# Patient Record
Sex: FEMALE | Race: OTHER | Employment: FULL TIME | URBAN - METROPOLITAN AREA
[De-identification: names, ages, dates, MRNs, and addresses within clinical notes are randomized per-mention and may not be internally consistent; named-entity substitution may affect disease eponyms.]

---

## 2021-11-03 ENCOUNTER — TELEPHONE (OUTPATIENT)
Dept: OBGYN CLINIC | Facility: CLINIC | Age: 31
End: 2021-11-03

## 2021-11-03 NOTE — TELEPHONE ENCOUNTER
LMP: Unsure mid to late August, states +HPT and states she went to [facility] on 10/24/2021 and had quant HCG done, pt states they indicated to her that her levels stated she was 7+ wks.  RN asked pt why she went to  for levels, pt stated because she \"wanted

## 2021-11-03 NOTE — TELEPHONE ENCOUNTER
Pt last LMP  Some times in late august ,   Pt aslo has pink discharge ,  At home positive pregnancy test ,

## 2021-11-04 ENCOUNTER — HOSPITAL ENCOUNTER (EMERGENCY)
Facility: HOSPITAL | Age: 31
Discharge: HOME | End: 2021-11-04
Payer: MEDICAID

## 2021-11-04 ENCOUNTER — APPOINTMENT (OUTPATIENT)
Dept: ULTRASOUND IMAGING | Facility: HOSPITAL | Age: 31
End: 2021-11-04
Attending: NURSE PRACTITIONER
Payer: MEDICAID

## 2021-11-04 VITALS
WEIGHT: 150 LBS | RESPIRATION RATE: 18 BRPM | TEMPERATURE: 98 F | OXYGEN SATURATION: 99 % | DIASTOLIC BLOOD PRESSURE: 69 MMHG | HEART RATE: 68 BPM | SYSTOLIC BLOOD PRESSURE: 144 MMHG

## 2021-11-04 DIAGNOSIS — O20.9 VAGINAL BLEEDING IN PREGNANCY, FIRST TRIMESTER: Primary | ICD-10-CM

## 2021-11-04 DIAGNOSIS — Z67.91 RH NEGATIVE STATE IN ANTEPARTUM PERIOD, FIRST TRIMESTER: ICD-10-CM

## 2021-11-04 DIAGNOSIS — O26.891 RH NEGATIVE STATE IN ANTEPARTUM PERIOD, FIRST TRIMESTER: ICD-10-CM

## 2021-11-04 DIAGNOSIS — N30.00 ACUTE CYSTITIS WITHOUT HEMATURIA: ICD-10-CM

## 2021-11-04 PROCEDURE — 84702 CHORIONIC GONADOTROPIN TEST: CPT | Performed by: NURSE PRACTITIONER

## 2021-11-04 PROCEDURE — 86900 BLOOD TYPING SEROLOGIC ABO: CPT | Performed by: NURSE PRACTITIONER

## 2021-11-04 PROCEDURE — 76817 TRANSVAGINAL US OBSTETRIC: CPT | Performed by: NURSE PRACTITIONER

## 2021-11-04 PROCEDURE — 81025 URINE PREGNANCY TEST: CPT

## 2021-11-04 PROCEDURE — 86850 RBC ANTIBODY SCREEN: CPT | Performed by: NURSE PRACTITIONER

## 2021-11-04 PROCEDURE — 85025 COMPLETE CBC W/AUTO DIFF WBC: CPT | Performed by: NURSE PRACTITIONER

## 2021-11-04 PROCEDURE — 76801 OB US < 14 WKS SINGLE FETUS: CPT | Performed by: NURSE PRACTITIONER

## 2021-11-04 PROCEDURE — 96372 THER/PROPH/DIAG INJ SC/IM: CPT

## 2021-11-04 PROCEDURE — 36415 COLL VENOUS BLD VENIPUNCTURE: CPT

## 2021-11-04 PROCEDURE — 99284 EMERGENCY DEPT VISIT MOD MDM: CPT

## 2021-11-04 PROCEDURE — 87086 URINE CULTURE/COLONY COUNT: CPT | Performed by: NURSE PRACTITIONER

## 2021-11-04 PROCEDURE — 80076 HEPATIC FUNCTION PANEL: CPT | Performed by: NURSE PRACTITIONER

## 2021-11-04 PROCEDURE — 86901 BLOOD TYPING SEROLOGIC RH(D): CPT | Performed by: NURSE PRACTITIONER

## 2021-11-04 PROCEDURE — 81001 URINALYSIS AUTO W/SCOPE: CPT | Performed by: NURSE PRACTITIONER

## 2021-11-04 PROCEDURE — 80048 BASIC METABOLIC PNL TOTAL CA: CPT | Performed by: NURSE PRACTITIONER

## 2021-11-04 RX ORDER — CEPHALEXIN 500 MG/1
500 CAPSULE ORAL 3 TIMES DAILY
Qty: 21 CAPSULE | Refills: 0 | Status: SHIPPED | OUTPATIENT
Start: 2021-11-04 | End: 2021-11-11

## 2021-11-04 NOTE — ED INITIAL ASSESSMENT (HPI)
Patient complains of abd pain and spotting since yesterday, states she found out she was pregnant about a week ago

## 2021-11-04 NOTE — TELEPHONE ENCOUNTER
Pt states she did not go to the ER yesterday. She got off work late, felt nauseous, vomited then felt better. Pt states she feels fine today. Pt informed we recommended the ER due to her spotting and cramping.   Pt states she only had light pink spotting

## 2021-11-04 NOTE — TELEPHONE ENCOUNTER
Patient calling to proceed with scheduling, patient is feeling much better today. Please call at [phone],[facility].

## 2021-11-04 NOTE — TELEPHONE ENCOUNTER
Pt advised to go to the ER and explain that she is early pregnant and had some spotting and is unsure if her blood type, but thinks it might be negative. Pt agrees with plan.

## 2021-11-05 NOTE — ED QUICK NOTES
Pt A&OX4, pt denies chills, fever, low back pain. Discharge paperwork, prescription, and follow-up discussed with pt, pt verbally understands them. Pt discharged in no acute distress.

## 2021-11-05 NOTE — ED PROVIDER NOTES
Patient Seen in: [facility] AND [facility] Emergency Department      History   Patient presents with:  Pregnancy Issues    Stated Complaint: Pregnancy Issues    Subjective:   HPI    70-year-old female,  1 para 0,LMP9 presents to the emergency room with co Mental Status: She is alert. ED Course     Labs Reviewed   URINALYSIS WITH CULTURE REFLEX - Abnormal; Notable for the following components:       Result Value    Leukocyte Esterase Urine Moderate (*)     Squamous Epi.  Cells Few (*)     All othe RAINBOW DRAW GOLD   URINE CULTURE, ROUTINE   CBC W/ DIFFERENTIAL                   MDM      72-year-old female with spotting and mild cramping first trimester pregnancy  Differential diagnosis considered for causes of vaginal bleeding including ectopic p

## 2021-11-06 NOTE — TELEPHONE ENCOUNTER
Pt was seen in the ER on 11/4/2021. US done. Blood type was O-. Pt is scheduled for rhogam injection on 11/6/2021.

## 2021-11-10 ENCOUNTER — OFFICE VISIT (OUTPATIENT)
Dept: OBGYN CLINIC | Facility: CLINIC | Age: 31
End: 2021-11-10
Payer: MEDICAID

## 2021-11-10 VITALS — SYSTOLIC BLOOD PRESSURE: 118 MMHG | HEART RATE: 83 BPM | DIASTOLIC BLOOD PRESSURE: 80 MMHG | WEIGHT: 157.38 LBS

## 2021-11-10 DIAGNOSIS — O20.0 THREATENED ABORTION, ANTEPARTUM: Primary | ICD-10-CM

## 2021-11-10 PROCEDURE — 99203 OFFICE O/P NEW LOW 30 MIN: CPT | Performed by: OBSTETRICS & GYNECOLOGY

## 2021-11-10 PROCEDURE — 3074F SYST BP LT 130 MM HG: CPT | Performed by: OBSTETRICS & GYNECOLOGY

## 2021-11-10 PROCEDURE — 3079F DIAST BP 80-89 MM HG: CPT | Performed by: OBSTETRICS & GYNECOLOGY

## 2021-11-10 NOTE — H&P
HPI:  The patient is a 37 yo G1PO @ 10w2d by 1TUS here with vaginal bleeding. Pt went to ER on 11/4 with spotting. US showed viable IUP and received rhogam.  Bleeding had stopped.   IC a few days ago and bright red unprovoked bleeding this AM.  No crampin file  Physical Activity: Not on file  Stress: Not on file  Social Connections: Not on file  Intimate Partner Violence: Not on file  Housing Stability: Not on file    FAMILY HISTORY:  History reviewed. No pertinent family history.     MEDICATIONS:    Current antepartum        Scant blood in vault with viable IUP on exam.  Rhogam already given in ER. Discussed PNC in our office. All questions answered.   Taking PNVs

## 2021-11-18 ENCOUNTER — NURSE ONLY (OUTPATIENT)
Dept: OBGYN CLINIC | Facility: CLINIC | Age: 31
End: 2021-11-18
Payer: MEDICAID

## 2021-11-18 ENCOUNTER — LAB ENCOUNTER (OUTPATIENT)
Dept: LAB | Facility: HOSPITAL | Age: 31
End: 2021-11-18
Attending: OBSTETRICS & GYNECOLOGY
Payer: MEDICAID

## 2021-11-18 ENCOUNTER — TELEPHONE (OUTPATIENT)
Dept: OBGYN CLINIC | Facility: CLINIC | Age: 31
End: 2021-11-18

## 2021-11-18 VITALS — HEIGHT: 62 IN | BODY MASS INDEX: 28.82 KG/M2 | WEIGHT: 156.63 LBS

## 2021-11-18 DIAGNOSIS — Z36.9 FIRST TRIMESTER SCREENING: Primary | ICD-10-CM

## 2021-11-18 DIAGNOSIS — Z34.01 ENCOUNTER FOR SUPERVISION OF NORMAL FIRST PREGNANCY IN FIRST TRIMESTER: ICD-10-CM

## 2021-11-18 DIAGNOSIS — Z34.01 ENCOUNTER FOR SUPERVISION OF NORMAL FIRST PREGNANCY IN FIRST TRIMESTER: Primary | ICD-10-CM

## 2021-11-18 PROCEDURE — 86850 RBC ANTIBODY SCREEN: CPT

## 2021-11-18 PROCEDURE — 86901 BLOOD TYPING SEROLOGIC RH(D): CPT

## 2021-11-18 PROCEDURE — 87086 URINE CULTURE/COLONY COUNT: CPT

## 2021-11-18 PROCEDURE — 86900 BLOOD TYPING SEROLOGIC ABO: CPT

## 2021-11-18 PROCEDURE — 86787 VARICELLA-ZOSTER ANTIBODY: CPT

## 2021-11-18 PROCEDURE — 86880 COOMBS TEST DIRECT: CPT

## 2021-11-18 PROCEDURE — 87340 HEPATITIS B SURFACE AG IA: CPT

## 2021-11-18 PROCEDURE — 99211 OFF/OP EST MAY X REQ PHY/QHP: CPT | Performed by: OBSTETRICS & GYNECOLOGY

## 2021-11-18 PROCEDURE — 86780 TREPONEMA PALLIDUM: CPT

## 2021-11-18 PROCEDURE — 36415 COLL VENOUS BLD VENIPUNCTURE: CPT

## 2021-11-18 PROCEDURE — 86803 HEPATITIS C AB TEST: CPT

## 2021-11-18 PROCEDURE — 3008F BODY MASS INDEX DOCD: CPT | Performed by: OBSTETRICS & GYNECOLOGY

## 2021-11-18 PROCEDURE — 86870 RBC ANTIBODY IDENTIFICATION: CPT

## 2021-11-18 PROCEDURE — 86762 RUBELLA ANTIBODY: CPT

## 2021-11-18 PROCEDURE — 87389 HIV-1 AG W/HIV-1&-2 AB AG IA: CPT

## 2021-11-18 PROCEDURE — 86077 PHYS BLOOD BANK SERV XMATCH: CPT

## 2021-11-18 PROCEDURE — 85025 COMPLETE CBC W/AUTO DIFF WBC: CPT

## 2021-11-18 RX ORDER — DIPHENHYDRAMINE HYDROCHLORIDE 25 MG/1
25 CAPSULE ORAL 2 TIMES DAILY
COMMUNITY

## 2021-11-18 NOTE — TELEPHONE ENCOUNTER
Pt seen for OBN today. She is 11 weeks. Wishes for FTS and MFM consult. She will be 13 weeks at NPN so sending to [facility] now. Please initiate so pt can call and make appt for FTS. Thank you.

## 2021-11-18 NOTE — PROGRESS NOTES
Pt seen for OBN appt today with no complaints. Normal PN labs plus Hep C and varicella ordered. Pt advised all labs must be completed and resulted prior to MD appt. Pt scheduled NPN appt with MD.    JIN has end stage renal disease.  Has hx of 2 kidney tr with:  Patient's age 28 years or older as of estimated date of delivery: No   [place]ia ([facility], [facility] [place], [address] [facility], or  background):  MCV less than 80: No   Neural tube defect (Meningomyelocele, Spina bifida, or Anencephaly): No   Congenital hear

## 2021-11-19 ENCOUNTER — TELEPHONE (OUTPATIENT)
Dept: PERINATAL CARE | Facility: HOSPITAL | Age: 31
End: 2021-11-19

## 2021-11-19 NOTE — TELEPHONE ENCOUNTER
Patient calling at 11 4/7 to schedule FTS. We do not have availability phone number given to other locations. Patient stated that appointment important due to partner with Kidney transplant.   Informed patient DX is not listed on order and that I will try

## 2021-11-29 ENCOUNTER — INITIAL PRENATAL (OUTPATIENT)
Dept: OBGYN CLINIC | Facility: CLINIC | Age: 31
End: 2021-11-29
Payer: MEDICAID

## 2021-11-29 VITALS
HEART RATE: 76 BPM | WEIGHT: 155.81 LBS | BODY MASS INDEX: 29 KG/M2 | DIASTOLIC BLOOD PRESSURE: 78 MMHG | SYSTOLIC BLOOD PRESSURE: 121 MMHG

## 2021-11-29 DIAGNOSIS — Z34.01 ENCOUNTER FOR SUPERVISION OF NORMAL FIRST PREGNANCY IN FIRST TRIMESTER: Primary | ICD-10-CM

## 2021-11-29 PROBLEM — O26.899 RH NEGATIVE STATE IN ANTEPARTUM PERIOD: Status: ACTIVE | Noted: 2021-11-29

## 2021-11-29 PROBLEM — Z67.91 RH NEGATIVE STATE IN ANTEPARTUM PERIOD: Status: ACTIVE | Noted: 2021-11-29

## 2021-11-29 PROCEDURE — 3074F SYST BP LT 130 MM HG: CPT | Performed by: OBSTETRICS & GYNECOLOGY

## 2021-11-29 PROCEDURE — 0500F INITIAL PRENATAL CARE VISIT: CPT | Performed by: OBSTETRICS & GYNECOLOGY

## 2021-11-29 PROCEDURE — 3078F DIAST BP <80 MM HG: CPT | Performed by: OBSTETRICS & GYNECOLOGY

## 2021-11-29 PROCEDURE — 81002 URINALYSIS NONAUTO W/O SCOPE: CPT | Performed by: OBSTETRICS & GYNECOLOGY

## 2021-12-02 NOTE — PROGRESS NOTES
Has appt for FTS tomorrow, had rhogam on 11/4 for VB, she has never had a pap smear, h/o vaping told her to stop immediately, h/o irreg menses so dated by US, pap done

## 2021-12-27 ENCOUNTER — ROUTINE PRENATAL (OUTPATIENT)
Dept: OBGYN CLINIC | Facility: CLINIC | Age: 31
End: 2021-12-27
Payer: MEDICAID

## 2021-12-27 VITALS
BODY MASS INDEX: 28 KG/M2 | DIASTOLIC BLOOD PRESSURE: 72 MMHG | WEIGHT: 155.38 LBS | HEART RATE: 67 BPM | SYSTOLIC BLOOD PRESSURE: 112 MMHG

## 2021-12-27 DIAGNOSIS — Z34.92 ENCOUNTER FOR SUPERVISION OF NORMAL PREGNANCY IN SECOND TRIMESTER, UNSPECIFIED GRAVIDITY: Primary | ICD-10-CM

## 2021-12-27 PROCEDURE — 3078F DIAST BP <80 MM HG: CPT | Performed by: OBSTETRICS & GYNECOLOGY

## 2021-12-27 PROCEDURE — 3074F SYST BP LT 130 MM HG: CPT | Performed by: OBSTETRICS & GYNECOLOGY

## 2021-12-27 PROCEDURE — 81002 URINALYSIS NONAUTO W/O SCOPE: CPT | Performed by: OBSTETRICS & GYNECOLOGY

## 2021-12-27 PROCEDURE — 0502F SUBSEQUENT PRENATAL CARE: CPT | Performed by: OBSTETRICS & GYNECOLOGY

## 2021-12-29 NOTE — PROGRESS NOTES
Cindy Shields at visit- he runs a [employer]. Message from [facility] about normal panorama but I don't see it scanned. Scheduled for booster and flu vaccines today. Level 2 01-20-22.

## 2022-01-12 ENCOUNTER — TELEPHONE (OUTPATIENT)
Dept: OBGYN CLINIC | Facility: CLINIC | Age: 32
End: 2022-01-12

## 2022-01-12 RX ORDER — BUTALBITAL, ACETAMINOPHEN AND CAFFEINE 300; 40; 50 MG/1; MG/1; MG/1
1 CAPSULE ORAL EVERY 4 HOURS PRN
Qty: 20 CAPSULE | Refills: 0 | Status: SHIPPED | OUTPATIENT
Start: 2022-01-12

## 2022-01-12 NOTE — TELEPHONE ENCOUNTER
Pt informed of [facility] recs and verbalized understanding. Received pop up when trying to order Fioricet stating that \"controlled substances cannot be e prescribed since the logged in user is different from the authorizing provider\".  Med pended to [facility] to

## 2022-01-22 ENCOUNTER — HOSPITAL ENCOUNTER (EMERGENCY)
Facility: HOSPITAL | Age: 32
Discharge: HOME | End: 2022-01-22
Attending: EMERGENCY MEDICINE
Payer: MEDICAID

## 2022-01-22 ENCOUNTER — TELEPHONE (OUTPATIENT)
Dept: OBGYN CLINIC | Facility: CLINIC | Age: 32
End: 2022-01-22

## 2022-01-22 VITALS
TEMPERATURE: 98 F | HEART RATE: 86 BPM | SYSTOLIC BLOOD PRESSURE: 114 MMHG | OXYGEN SATURATION: 99 % | BODY MASS INDEX: 29.45 KG/M2 | DIASTOLIC BLOOD PRESSURE: 69 MMHG | WEIGHT: 156 LBS | HEIGHT: 61 IN | RESPIRATION RATE: 17 BRPM

## 2022-01-22 DIAGNOSIS — U07.1 COVID-19 AFFECTING PREGNANCY IN SECOND TRIMESTER: ICD-10-CM

## 2022-01-22 DIAGNOSIS — O98.512 COVID-19 AFFECTING PREGNANCY IN SECOND TRIMESTER: ICD-10-CM

## 2022-01-22 DIAGNOSIS — Z20.822 CLOSE EXPOSURE TO COVID-19 VIRUS: Primary | ICD-10-CM

## 2022-01-22 DIAGNOSIS — U07.1 COVID-19 VIRUS INFECTION: Primary | ICD-10-CM

## 2022-01-22 LAB
S PYO AG THROAT QL: NEGATIVE
SARS-COV-2 RNA RESP QL NAA+PROBE: DETECTED

## 2022-01-22 PROCEDURE — 99283 EMERGENCY DEPT VISIT LOW MDM: CPT

## 2022-01-22 PROCEDURE — 87880 STREP A ASSAY W/OPTIC: CPT

## 2022-01-22 NOTE — ED INITIAL ASSESSMENT (HPI)
Pt c/o cough, congestion, & sore throat since Wednesday night. States that she is fully vaccinated for COVID and notes a + covid exposure earlier this week. Denies fevers. Pt 21wk pregnant. . States that she feels baby moving, denies abd pain.

## 2022-01-22 NOTE — TELEPHONE ENCOUNTER
Pt called service c/o cough, sore throat and nasal congestion. Had covid exposure at work recently. covid test ordered. Discussed meds safe in pregnancy. Encouraged quarantine until covid test results  Please follow up on Monday with pt check on status.

## 2022-01-23 NOTE — ED PROVIDER NOTES
Patient Seen in: [facility] AND [facility] Emergency Department      History   Patient presents with:  Sore Throat  Cough/URI    Stated Complaint: Covid symptoms, coughing, headache    Subjective:   HPI    The patient is a 42-year-old female who is 21 weeks preg Rate and Rhythm: Normal rate and regular rhythm. Heart sounds: Normal heart sounds. No murmur heard. Pulmonary:      Effort: Pulmonary effort is normal.      Breath sounds: Normal breath sounds.    Abdominal:      General: Bowel sounds are normal.

## 2022-01-24 ENCOUNTER — TELEPHONE (OUTPATIENT)
Dept: OBGYN CLINIC | Facility: CLINIC | Age: 32
End: 2022-01-24

## 2022-01-24 ENCOUNTER — TELEMEDICINE (OUTPATIENT)
Dept: OBGYN CLINIC | Facility: CLINIC | Age: 32
End: 2022-01-24

## 2022-01-24 DIAGNOSIS — U07.1 COVID-19 AFFECTING PREGNANCY, ANTEPARTUM: Primary | ICD-10-CM

## 2022-01-24 DIAGNOSIS — O98.519 COVID-19 AFFECTING PREGNANCY, ANTEPARTUM: Primary | ICD-10-CM

## 2022-01-24 PROCEDURE — 0502F SUBSEQUENT PRENATAL CARE: CPT | Performed by: OBSTETRICS & GYNECOLOGY

## 2022-01-24 NOTE — TELEPHONE ENCOUNTER
Patient tested positive for covid on 1/22. She needs some guidance because she is supposed to be seen for a prenatal visit today. In addition, she thought the 20 week ultrasound was done in our office at this appointment. Please advise.

## 2022-01-24 NOTE — TELEPHONE ENCOUNTER
Informed prenatal that her appt today with LILLIAM will be converted to a video visit. Informed  to convert to a video visit. Pt given the time for the video visit today.

## 2022-01-24 NOTE — TELEPHONE ENCOUNTER
21w0d.  Prenatal pt is positive for Covid on 1-22-22. Pt states that symptoms started on Friday. Pt has an appt for today with [facility]. Sent to [employer] if you want a video appt. Pt stating that she needs her 20 week ob ultrasound. Sent to [employer] for recs.

## 2022-01-25 PROBLEM — U07.1 COVID-19 AFFECTING PREGNANCY, ANTEPARTUM: Status: ACTIVE | Noted: 2022-01-25

## 2022-01-25 PROBLEM — O98.519 COVID-19 AFFECTING PREGNANCY, ANTEPARTUM: Status: ACTIVE | Noted: 2022-01-25

## 2022-02-10 PROBLEM — O09.899 SINGLE UMBILICAL ARTERY, MATERNAL, ANTEPARTUM: Status: ACTIVE | Noted: 2022-02-10

## 2022-02-21 ENCOUNTER — TELEPHONE (OUTPATIENT)
Dept: OBGYN CLINIC | Facility: CLINIC | Age: 32
End: 2022-02-21

## 2022-02-21 ENCOUNTER — ROUTINE PRENATAL (OUTPATIENT)
Dept: OBGYN CLINIC | Facility: CLINIC | Age: 32
End: 2022-02-21
Payer: MEDICAID

## 2022-02-21 VITALS
BODY MASS INDEX: 30 KG/M2 | SYSTOLIC BLOOD PRESSURE: 122 MMHG | WEIGHT: 162.19 LBS | DIASTOLIC BLOOD PRESSURE: 76 MMHG | HEART RATE: 84 BPM

## 2022-02-21 DIAGNOSIS — Z34.92 ENCOUNTER FOR SUPERVISION OF NORMAL PREGNANCY IN SECOND TRIMESTER, UNSPECIFIED GRAVIDITY: Primary | ICD-10-CM

## 2022-02-21 LAB
BILIRUBIN: NEGATIVE
GLUCOSE (URINE DIPSTICK): NEGATIVE MG/DL
KETONES (URINE DIPSTICK): NEGATIVE MG/DL
LEUKOCYTES: NEGATIVE
MULTISTIX LOT#: NORMAL NUMERIC
NITRITE, URINE: NEGATIVE
OCCULT BLOOD: NEGATIVE
PH, URINE: 6.5 (ref 4.5–8)
PROTEIN (URINE DIPSTICK): NEGATIVE MG/DL
SPECIFIC GRAVITY: 1.01 (ref 1–1.03)
UROBILINOGEN,SEMI-QN: 0.2 MG/DL (ref 0–1.9)

## 2022-02-21 PROCEDURE — 81002 URINALYSIS NONAUTO W/O SCOPE: CPT | Performed by: OBSTETRICS & GYNECOLOGY

## 2022-02-21 PROCEDURE — 3078F DIAST BP <80 MM HG: CPT | Performed by: OBSTETRICS & GYNECOLOGY

## 2022-02-21 PROCEDURE — 3074F SYST BP LT 130 MM HG: CPT | Performed by: OBSTETRICS & GYNECOLOGY

## 2022-02-21 PROCEDURE — 0502F SUBSEQUENT PRENATAL CARE: CPT | Performed by: OBSTETRICS & GYNECOLOGY

## 2022-03-05 ENCOUNTER — LAB ENCOUNTER (OUTPATIENT)
Dept: LAB | Facility: REFERENCE LAB | Age: 32
End: 2022-03-05
Attending: OBSTETRICS & GYNECOLOGY
Payer: MEDICAID

## 2022-03-05 DIAGNOSIS — Z34.92 ENCOUNTER FOR SUPERVISION OF NORMAL PREGNANCY IN SECOND TRIMESTER, UNSPECIFIED GRAVIDITY: ICD-10-CM

## 2022-03-05 LAB
ANTIBODY SCREEN: NEGATIVE
DEPRECATED RDW RBC AUTO: 44.1 FL (ref 35.1–46.3)
ERYTHROCYTE [DISTWIDTH] IN BLOOD BY AUTOMATED COUNT: 13.2 % (ref 11–15)
GLUCOSE 1H P GLC SERPL-MCNC: 157 MG/DL
HCT VFR BLD AUTO: 34.2 %
HGB BLD-MCNC: 11 G/DL
MCH RBC QN AUTO: 29.5 PG (ref 26–34)
MCHC RBC AUTO-ENTMCNC: 32.2 G/DL (ref 31–37)
MCV RBC AUTO: 91.7 FL
PLATELET # BLD AUTO: 268 10(3)UL (ref 150–450)
RBC # BLD AUTO: 3.73 X10(6)UL
RH BLOOD TYPE: NEGATIVE
WBC # BLD AUTO: 12.4 X10(3) UL (ref 4–11)

## 2022-03-05 PROCEDURE — 86901 BLOOD TYPING SEROLOGIC RH(D): CPT

## 2022-03-05 PROCEDURE — 85027 COMPLETE CBC AUTOMATED: CPT

## 2022-03-05 PROCEDURE — 82950 GLUCOSE TEST: CPT

## 2022-03-05 PROCEDURE — 86850 RBC ANTIBODY SCREEN: CPT

## 2022-03-05 PROCEDURE — 86900 BLOOD TYPING SEROLOGIC ABO: CPT

## 2022-03-05 PROCEDURE — 36415 COLL VENOUS BLD VENIPUNCTURE: CPT

## 2022-03-11 ENCOUNTER — TELEPHONE (OUTPATIENT)
Dept: OBGYN CLINIC | Facility: CLINIC | Age: 32
End: 2022-03-11

## 2022-03-11 NOTE — TELEPHONE ENCOUNTER
Informed pt that she did not pass the 1 hr gtt. Level was 157. Informed pt that she needs her 3 hr gtt. Gave her fasting instructions and phone number to schedule it.

## 2022-03-11 NOTE — TELEPHONE ENCOUNTER
----- Message from Reyes Corn, MD sent at 3/10/2022  2:42 PM CST -----  1 hr glucola 157.   Needs 3 hr GTT

## 2022-03-14 ENCOUNTER — ROUTINE PRENATAL (OUTPATIENT)
Dept: OBGYN CLINIC | Facility: CLINIC | Age: 32
End: 2022-03-14
Payer: MEDICAID

## 2022-03-14 ENCOUNTER — LABORATORY ENCOUNTER (OUTPATIENT)
Dept: LAB | Facility: HOSPITAL | Age: 32
End: 2022-03-14
Attending: OBSTETRICS & GYNECOLOGY
Payer: MEDICAID

## 2022-03-14 VITALS
DIASTOLIC BLOOD PRESSURE: 78 MMHG | WEIGHT: 165.38 LBS | BODY MASS INDEX: 30 KG/M2 | HEART RATE: 83 BPM | SYSTOLIC BLOOD PRESSURE: 127 MMHG

## 2022-03-14 DIAGNOSIS — O99.810 ABNORMAL MATERNAL GLUCOSE TOLERANCE, ANTEPARTUM: ICD-10-CM

## 2022-03-14 DIAGNOSIS — Z34.03 ENCOUNTER FOR SUPERVISION OF NORMAL FIRST PREGNANCY IN THIRD TRIMESTER: Primary | ICD-10-CM

## 2022-03-14 LAB
APPEARANCE: CLEAR
BILIRUBIN: NEGATIVE
GLUCOSE (URINE DIPSTICK): NEGATIVE MG/DL
GLUCOSE 1H P GLC SERPL-MCNC: 138 MG/DL
GLUCOSE 3H P GLC SERPL-MCNC: 97 MG/DL (ref 70–140)
GLUCOSE P FAST SERPL-MCNC: 81 MG/DL
KETONES (URINE DIPSTICK): NEGATIVE MG/DL
MULTISTIX LOT#: ABNORMAL NUMERIC
NITRITE, URINE: NEGATIVE
OCCULT BLOOD: NEGATIVE
PH, URINE: 7 (ref 4.5–8)
PROTEIN (URINE DIPSTICK): NEGATIVE MG/DL
SPECIFIC GRAVITY: 1.02 (ref 1–1.03)
URINE-COLOR: YELLOW
UROBILINOGEN,SEMI-QN: 0.2 MG/DL (ref 0–1.9)

## 2022-03-14 PROCEDURE — 0502F SUBSEQUENT PRENATAL CARE: CPT | Performed by: OBSTETRICS & GYNECOLOGY

## 2022-03-14 PROCEDURE — 82951 GLUCOSE TOLERANCE TEST (GTT): CPT

## 2022-03-14 PROCEDURE — 3078F DIAST BP <80 MM HG: CPT | Performed by: OBSTETRICS & GYNECOLOGY

## 2022-03-14 PROCEDURE — 36415 COLL VENOUS BLD VENIPUNCTURE: CPT

## 2022-03-14 PROCEDURE — 82952 GTT-ADDED SAMPLES: CPT

## 2022-03-14 PROCEDURE — 96372 THER/PROPH/DIAG INJ SC/IM: CPT | Performed by: OBSTETRICS & GYNECOLOGY

## 2022-03-14 PROCEDURE — 81002 URINALYSIS NONAUTO W/O SCOPE: CPT | Performed by: OBSTETRICS & GYNECOLOGY

## 2022-03-14 PROCEDURE — 3074F SYST BP LT 130 MM HG: CPT | Performed by: OBSTETRICS & GYNECOLOGY

## 2022-03-14 NOTE — PROGRESS NOTES
rhogam administered to pts right upper outer gluteus. pt tolerated injection well. Rhogam card given to pt.

## 2022-03-28 ENCOUNTER — ROUTINE PRENATAL (OUTPATIENT)
Dept: OBGYN CLINIC | Facility: CLINIC | Age: 32
End: 2022-03-28
Payer: MEDICAID

## 2022-03-28 VITALS
HEART RATE: 90 BPM | SYSTOLIC BLOOD PRESSURE: 121 MMHG | WEIGHT: 166.81 LBS | BODY MASS INDEX: 31 KG/M2 | DIASTOLIC BLOOD PRESSURE: 78 MMHG

## 2022-03-28 DIAGNOSIS — Z3A.30 30 WEEKS GESTATION OF PREGNANCY: Primary | ICD-10-CM

## 2022-03-28 DIAGNOSIS — Z67.91 RH NEGATIVE STATE IN ANTEPARTUM PERIOD: ICD-10-CM

## 2022-03-28 DIAGNOSIS — O26.899 RH NEGATIVE STATE IN ANTEPARTUM PERIOD: ICD-10-CM

## 2022-03-28 DIAGNOSIS — O09.899 SINGLE UMBILICAL ARTERY AFFECTING MANAGEMENT OF MOTHER IN SINGLETON PREGNANCY, ANTEPARTUM: ICD-10-CM

## 2022-03-28 DIAGNOSIS — Z23 NEED FOR DIPHTHERIA-TETANUS-PERTUSSIS (TDAP) VACCINE: ICD-10-CM

## 2022-03-28 LAB
APPEARANCE: CLEAR
BILIRUBIN: NEGATIVE
GLUCOSE (URINE DIPSTICK): NEGATIVE MG/DL
KETONES (URINE DIPSTICK): NEGATIVE MG/DL
MULTISTIX LOT#: ABNORMAL NUMERIC
NITRITE, URINE: NEGATIVE
PH, URINE: 7 (ref 4.5–8)
SPECIFIC GRAVITY: 1.02 (ref 1–1.03)
URINE-COLOR: YELLOW
UROBILINOGEN,SEMI-QN: 0.2 MG/DL (ref 0–1.9)

## 2022-03-28 PROCEDURE — 90471 IMMUNIZATION ADMIN: CPT | Performed by: NURSE PRACTITIONER

## 2022-03-28 PROCEDURE — 0502F SUBSEQUENT PRENATAL CARE: CPT | Performed by: NURSE PRACTITIONER

## 2022-03-28 PROCEDURE — 3074F SYST BP LT 130 MM HG: CPT | Performed by: NURSE PRACTITIONER

## 2022-03-28 PROCEDURE — 90715 TDAP VACCINE 7 YRS/> IM: CPT | Performed by: NURSE PRACTITIONER

## 2022-03-28 PROCEDURE — 3078F DIAST BP <80 MM HG: CPT | Performed by: NURSE PRACTITIONER

## 2022-03-28 PROCEDURE — 81002 URINALYSIS NONAUTO W/O SCOPE: CPT | Performed by: NURSE PRACTITIONER

## 2022-03-28 NOTE — PROGRESS NOTES
+Fetal movement, denies contractions, denies LOF, denies bleeding, some swelling in hands and feet at end of week. She does report right groin pain when walking or changing from side to side when laying down. rev'd support belt and compression stockings. Some anxiety,  reports panic attack 2 days ago. Reports was laying on side playing video games and felt nauseated and palpitations and started crying. Doesn't report feeling more stressed but has hx of anxiety. Has appointment with therapist next week.     MFM growth tomorrow    tdap today  rev'd Kick counts, rev'd sx of PTL and pre eclampsia  RTO 2 weeks  Vivek Ty, TEENA

## 2022-03-29 PROBLEM — O35.8XX0 FETAL RENAL ANOMALY, SINGLE GESTATION: Status: ACTIVE | Noted: 2022-03-29

## 2022-03-29 PROBLEM — O43.123 VELAMENTOUS INSERTION OF UMBILICAL CORD IN THIRD TRIMESTER: Status: ACTIVE | Noted: 2022-03-29

## 2022-04-08 ENCOUNTER — TELEPHONE (OUTPATIENT)
Dept: PEDIATRICS CLINIC | Facility: CLINIC | Age: 32
End: 2022-04-08

## 2022-04-11 ENCOUNTER — TELEPHONE (OUTPATIENT)
Dept: OBGYN CLINIC | Facility: CLINIC | Age: 32
End: 2022-04-11

## 2022-04-11 ENCOUNTER — ROUTINE PRENATAL (OUTPATIENT)
Dept: OBGYN CLINIC | Facility: CLINIC | Age: 32
End: 2022-04-11
Payer: MEDICAID

## 2022-04-11 VITALS
DIASTOLIC BLOOD PRESSURE: 75 MMHG | BODY MASS INDEX: 31 KG/M2 | WEIGHT: 169 LBS | SYSTOLIC BLOOD PRESSURE: 116 MMHG | HEART RATE: 85 BPM

## 2022-04-11 DIAGNOSIS — Z34.03 ENCOUNTER FOR SUPERVISION OF NORMAL FIRST PREGNANCY IN THIRD TRIMESTER: Primary | ICD-10-CM

## 2022-04-11 LAB
APPEARANCE: CLEAR
GLUCOSE (URINE DIPSTICK): NEGATIVE MG/DL
MULTISTIX LOT#: NORMAL NUMERIC
NITRITE, URINE: NEGATIVE
URINE-COLOR: YELLOW

## 2022-04-11 PROCEDURE — 0502F SUBSEQUENT PRENATAL CARE: CPT | Performed by: OBSTETRICS & GYNECOLOGY

## 2022-04-11 PROCEDURE — 81002 URINALYSIS NONAUTO W/O SCOPE: CPT | Performed by: OBSTETRICS & GYNECOLOGY

## 2022-04-11 PROCEDURE — 3074F SYST BP LT 130 MM HG: CPT | Performed by: OBSTETRICS & GYNECOLOGY

## 2022-04-11 PROCEDURE — 3078F DIAST BP <80 MM HG: CPT | Performed by: OBSTETRICS & GYNECOLOGY

## 2022-04-11 NOTE — TELEPHONE ENCOUNTER
Needs weekly NST due to velammentous cord insertion, hx covid -- tried to schedule in [facility] & told we have to do it

## 2022-04-12 ENCOUNTER — TELEPHONE (OUTPATIENT)
Dept: PERINATAL CARE | Facility: HOSPITAL | Age: 32
End: 2022-04-12

## 2022-04-20 ENCOUNTER — TELEPHONE (OUTPATIENT)
Dept: OBGYN CLINIC | Facility: CLINIC | Age: 32
End: 2022-04-20

## 2022-04-20 NOTE — TELEPHONE ENCOUNTER
Pt is 33w2d, reports pain to right wrist, hand and thumb for a few days. Reports pain is constant and getting worse as days pass. States this morning her hand feels stiff and it is difficult to . 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 Denies swelling to left hand. Reports some swelling to feet. Pt has not tried anything for pain. States she will take tylenol when she gets home today. Pt is asking if she can wear a hand brace? Advised pt the increased blood volume during pregnancy can cause inflammation and pressure on nerves. Informed pt message will be forwarded to on call to ask if okay to wear a brace or does pt need to be evaluated? Pt has PN appt on 4/25. Informed pt we will call her as soon as we have a response. Pt states she is almost leaving work and would like to know an answer soon so that she can stop at the store for a brace after work. Pt asking when she will have a response. Advised pt nurse cannot guarantee a specific time but it will be today. Advised pt she can also try calling her PCP for recs pt agrees. To [facility] on call to please advise.

## 2022-04-22 ENCOUNTER — TELEPHONE (OUTPATIENT)
Dept: PEDIATRICS CLINIC | Facility: CLINIC | Age: 32
End: 2022-04-22

## 2022-04-22 NOTE — TELEPHONE ENCOUNTER
Pt states she spoke to [facility] since calling our clinic and scheduled her NST's. We can disregard call.

## 2022-04-25 ENCOUNTER — ROUTINE PRENATAL (OUTPATIENT)
Dept: OBGYN CLINIC | Facility: CLINIC | Age: 32
End: 2022-04-25
Payer: MEDICAID

## 2022-04-25 VITALS
HEART RATE: 87 BPM | BODY MASS INDEX: 32 KG/M2 | DIASTOLIC BLOOD PRESSURE: 75 MMHG | WEIGHT: 173 LBS | SYSTOLIC BLOOD PRESSURE: 118 MMHG

## 2022-04-25 DIAGNOSIS — Z34.03 ENCOUNTER FOR SUPERVISION OF NORMAL FIRST PREGNANCY IN THIRD TRIMESTER: Primary | ICD-10-CM

## 2022-04-25 LAB
APPEARANCE: CLEAR
GLUCOSE (URINE DIPSTICK): NEGATIVE MG/DL
MULTISTIX LOT#: NORMAL NUMERIC
NITRITE, URINE: NEGATIVE

## 2022-04-26 ENCOUNTER — TELEPHONE (OUTPATIENT)
Dept: OBGYN CLINIC | Facility: CLINIC | Age: 32
End: 2022-04-26

## 2022-04-26 NOTE — TELEPHONE ENCOUNTER
Received from [facility], a note from Silvestre Garcia MD, dated 4/25/22. Placed on K's desk.

## 2022-05-02 ENCOUNTER — HOSPITAL ENCOUNTER (OUTPATIENT)
Dept: PERINATAL CARE | Facility: HOSPITAL | Age: 32
End: 2022-05-02
Attending: OBSTETRICS & GYNECOLOGY
Payer: MEDICAID

## 2022-05-02 DIAGNOSIS — O09.899 SINGLE UMBILICAL ARTERY, MATERNAL, ANTEPARTUM: Primary | ICD-10-CM

## 2022-05-02 PROCEDURE — 59025 FETAL NON-STRESS TEST: CPT

## 2022-05-09 ENCOUNTER — ROUTINE PRENATAL (OUTPATIENT)
Dept: OBGYN CLINIC | Facility: CLINIC | Age: 32
End: 2022-05-09
Payer: MEDICAID

## 2022-05-09 ENCOUNTER — HOSPITAL ENCOUNTER (OUTPATIENT)
Dept: PERINATAL CARE | Facility: HOSPITAL | Age: 32
End: 2022-05-09
Attending: OBSTETRICS & GYNECOLOGY
Payer: MEDICAID

## 2022-05-09 ENCOUNTER — LAB ENCOUNTER (OUTPATIENT)
Dept: LAB | Facility: HOSPITAL | Age: 32
End: 2022-05-09
Attending: OBSTETRICS & GYNECOLOGY
Payer: MEDICAID

## 2022-05-09 VITALS
WEIGHT: 176.81 LBS | BODY MASS INDEX: 32 KG/M2 | HEART RATE: 73 BPM | SYSTOLIC BLOOD PRESSURE: 138 MMHG | DIASTOLIC BLOOD PRESSURE: 81 MMHG

## 2022-05-09 DIAGNOSIS — O09.899 SINGLE UMBILICAL ARTERY, MATERNAL, ANTEPARTUM: Primary | ICD-10-CM

## 2022-05-09 DIAGNOSIS — Z34.03 ENCOUNTER FOR SUPERVISION OF NORMAL FIRST PREGNANCY IN THIRD TRIMESTER: Primary | ICD-10-CM

## 2022-05-09 PROBLEM — O99.013 ANEMIA COMPLICATING PREGNANCY, THIRD TRIMESTER: Status: ACTIVE | Noted: 2022-05-09

## 2022-05-09 LAB
BILIRUBIN: NEGATIVE
DEPRECATED RDW RBC AUTO: 41.8 FL (ref 35.1–46.3)
ERYTHROCYTE [DISTWIDTH] IN BLOOD BY AUTOMATED COUNT: 13.1 % (ref 11–15)
GLUCOSE (URINE DIPSTICK): NEGATIVE MG/DL
HCT VFR BLD AUTO: 32.2 %
HGB BLD-MCNC: 10 G/DL
KETONES (URINE DIPSTICK): NEGATIVE MG/DL
LEUKOCYTES: NEGATIVE
MCH RBC QN AUTO: 27.3 PG (ref 26–34)
MCHC RBC AUTO-ENTMCNC: 31.1 G/DL (ref 31–37)
MCV RBC AUTO: 88 FL
MULTISTIX EXPIRATION DATE: NORMAL DATE
MULTISTIX LOT#: 1027 NUMERIC
NITRITE, URINE: NEGATIVE
OCCULT BLOOD: NEGATIVE
PH, URINE: 6.5 (ref 4.5–8)
PLATELET # BLD AUTO: 305 10(3)UL (ref 150–450)
PROTEIN (URINE DIPSTICK): NEGATIVE MG/DL
RBC # BLD AUTO: 3.66 X10(6)UL
SPECIFIC GRAVITY: 1.02 (ref 1–1.03)
UROBILINOGEN,SEMI-QN: 0.2 MG/DL (ref 0–1.9)
WBC # BLD AUTO: 13.1 X10(3) UL (ref 4–11)

## 2022-05-09 PROCEDURE — 36415 COLL VENOUS BLD VENIPUNCTURE: CPT | Performed by: OBSTETRICS & GYNECOLOGY

## 2022-05-09 PROCEDURE — 81002 URINALYSIS NONAUTO W/O SCOPE: CPT | Performed by: OBSTETRICS & GYNECOLOGY

## 2022-05-09 PROCEDURE — 87389 HIV-1 AG W/HIV-1&-2 AB AG IA: CPT | Performed by: OBSTETRICS & GYNECOLOGY

## 2022-05-09 PROCEDURE — 85027 COMPLETE CBC AUTOMATED: CPT | Performed by: OBSTETRICS & GYNECOLOGY

## 2022-05-09 PROCEDURE — 59025 FETAL NON-STRESS TEST: CPT | Performed by: OBSTETRICS & GYNECOLOGY

## 2022-05-09 PROCEDURE — 3075F SYST BP GE 130 - 139MM HG: CPT | Performed by: OBSTETRICS & GYNECOLOGY

## 2022-05-09 PROCEDURE — 0502F SUBSEQUENT PRENATAL CARE: CPT | Performed by: OBSTETRICS & GYNECOLOGY

## 2022-05-09 PROCEDURE — 86780 TREPONEMA PALLIDUM: CPT | Performed by: OBSTETRICS & GYNECOLOGY

## 2022-05-09 PROCEDURE — 59025 FETAL NON-STRESS TEST: CPT

## 2022-05-09 PROCEDURE — 3079F DIAST BP 80-89 MM HG: CPT | Performed by: OBSTETRICS & GYNECOLOGY

## 2022-05-11 ENCOUNTER — NST DOCUMENTATION (OUTPATIENT)
Dept: OBGYN CLINIC | Facility: CLINIC | Age: 32
End: 2022-05-11

## 2022-05-11 LAB
GROUP B STREP BY PCR FOR PCR OVT: NEGATIVE
T PALLIDUM AB SER QL: NEGATIVE

## 2022-05-11 NOTE — NST
Nonstress Test   Patient: Liliane Rivera    Gestation: 36w2d    Diagnosis from order: Single umbilical artery affecting management of mother in corado pregnancy, antepartum, 30 weeks gestation of pregnancy       NST:         NST DOCUMENTATION 2022   Variability 6-25 BPM   Accelerations Yes   Decelerations None   Baseline 130   Uterine Irritability No   Contractions Irregular   Acoustic Stimulator No   Nonstress Test Interpretation Reactive   Nonstress Test Second Interpretation -   NST Completed by Kerri CASTLE   Disposition Home    Testing Plan Weekly NST   Provider Notified Dave Webb MD         I agree with the above evaluation. NST completed.   Fawad Chavez MD  2022  7:35 AM

## 2022-05-16 ENCOUNTER — HOSPITAL ENCOUNTER (OUTPATIENT)
Dept: PERINATAL CARE | Facility: HOSPITAL | Age: 32
End: 2022-05-16
Attending: OBSTETRICS & GYNECOLOGY
Payer: MEDICAID

## 2022-05-16 DIAGNOSIS — O35.8XX0 FETAL RENAL ANOMALY, SINGLE GESTATION: Primary | ICD-10-CM

## 2022-05-16 PROCEDURE — 59025 FETAL NON-STRESS TEST: CPT

## 2022-05-16 NOTE — ADDENDUM NOTE
Encounter addended by: Henrik Benjamin RN on: 5/16/2022 11:36 AM   Actions taken: Flowsheet accepted

## 2022-05-24 NOTE — TELEPHONE ENCOUNTER
Received via fax from [facility] order for double electric breast pump. Order placed in the system. Faxed order filled out and faxed back, original sent to scanning.

## 2022-05-25 NOTE — TELEPHONE ENCOUNTER
Consult note dated 5/23/22 received via fax from [facility] and placed in [facility] appt folder slot in front MA office for upcoming PN appt on 5/31.

## 2022-05-25 NOTE — PROGRESS NOTES
Per MFM needs IOL 38-39 weeks for SUA and growth restriction- EFW at 11th %ile - scheduled for cytotec 5/26/22

## 2022-05-26 NOTE — PROGRESS NOTES
Pt is a 29year old female admitted to 375/375-A. Patient presents with:  Scheduled Induction: Single artery cord, velamentous cord insertion, IUGR     Pt is  38w3d intra-uterine pregnancy. History obtained, consents signed. Oriented to room, staff, and plan of care.

## 2022-05-27 NOTE — PROGRESS NOTES
Repeat Potassium still 2.9 -- lauro watts. D/w MFM -- recommend consult w/ hospitalist.   Page placed. Pt w/ telemetry on.  Denies CP / SOB

## 2022-05-27 NOTE — CM/SW NOTE
Pt is insured with Medicaid. SW informed Chris Rodrigez from [facility]. of above, and requested that he add infant to [facility] plan once infant is delivered. KAIA/VICKY to remain available for support and/or discharge planning.      Zachary Lennox, MSW, [facility] [place]  Social Work   GXP:#[phone]

## 2022-05-27 NOTE — PLAN OF CARE
Problem: Patient Centered Care  Goal: Patient preferences are identified and integrated in the patient's plan of care  Description: Interventions:  - What would you like us to know as we care for you? Expecting baby boy, Aleksandra Saldana. Plans to breastfeed. Meds okay. Plan for circ.   - Provide timely, complete, and accurate information to patient/family  - Incorporate patient and family knowledge, values, beliefs, and cultural backgrounds into the planning and delivery of care  - Encourage patient/family to participate in care and decision-making at the level they choose  - Honor patient and family perspectives and choices  Outcome: Progressing     Problem: Patient/Family Goals  Goal: Patient/Family Long Term Goal  Description: Patient's Long Term Goal: Home with healthy baby    Interventions:  - educate and intervene as appropriate  - See additional Care Plan goals for specific interventions  Outcome: Progressing  Goal: Patient/Family Short Term Goal  Description: Patient's Short Term Goal: manage pain of labor, prefers IV medication    Interventions:   - educate and intervene as appropriate  - See additional Care Plan goals for specific interventions  Outcome: Progressing     Problem: BIRTH - VAGINAL/ SECTION  Goal: Fetal and maternal status remain reassuring during the birth process  Description: INTERVENTIONS:  - Monitor vital signs  - Monitor fetal heart rate  - Monitor uterine activity  - Monitor labor progression (vaginal delivery)  - DVT prophylaxis (C/S delivery)  - Surgical antibiotic prophylaxis (C/S delivery)  Outcome: Progressing     Problem: PAIN - ADULT  Goal: Verbalizes/displays adequate comfort level or patient's stated pain goal  Description: INTERVENTIONS:  - Encourage pt to monitor pain and request assistance  - Assess pain using appropriate pain scale  - Administer analgesics based on type and severity of pain and evaluate response  - Implement non-pharmacological measures as appropriate and evaluate response  - Consider cultural and social influences on pain and pain management  - Manage/alleviate anxiety  - Utilize distraction and/or relaxation techniques  - Monitor for opioid side effects  - Notify MD/LIP if interventions unsuccessful or patient reports new pain  - Anticipate increased pain with activity and pre-medicate as appropriate  Outcome: Progressing     Problem: ANXIETY  Goal: Will report anxiety at manageable levels  Description: INTERVENTIONS:  - Administer medication as ordered  - Teach and rehearse alternative coping skills  - Provide emotional support with 1:1 interaction with staff  Outcome: Progressing

## 2022-05-27 NOTE — TELEPHONE ENCOUNTER
Patient delivered 5/27/2022 Please cancel upcoming appointments. If potential future scheduled CSx, notify MD & assistants.

## 2022-05-27 NOTE — PROGRESS NOTES
Labs back c/w severe preE. PC 0.44 and severe range BPs requiring IVP labetalol. Plan for mag sulfate and replace K.       Cx 10/100/+1 to +2 and feeling pelvic pressure    Start 2nd stage  Anticipate   SANDRITA at delivery

## 2022-05-27 NOTE — ANESTHESIA PROCEDURE NOTES
Labor Analgesia    Date/Time: 5/27/2022 6:16 AM  Performed by: Radha Estrada MD  Authorized by: Radha Estrada MD       General Information and Staff    Start Time:  5/27/2022 6:06 AM  End Time:  5/27/2022 6:16 AM  Anesthesiologist:  Radha Estrada MD  Performed by:   Anesthesiologist  Patient Location:  OB  Site Identification: surface landmarks    Reason for Block: labor epidural    Preanesthetic Checklist: patient identified, IV checked, risks and benefits discussed, monitors and equipment checked, pre-op evaluation, timeout performed, anesthesia consent and sterile technique used      Procedure Details    Patient Position:  Sitting  Prep: Betadine and patient draped    Monitoring:  Heart rate, cardiac monitor and continuous pulse ox  Approach:  Midline    Epidural Needle    Injection Technique:  IVAN air  Needle Type:  Tuohy  Needle Gauge:  18 G  Needle Length:  3.375 in  Needle Insertion Depth:  6  Location:  L3-4    Spinal Needle      Catheter    Catheter Type:  Side hole  Catheter Size:  20 G  Catheter at Skin Depth:  14    Assessment  Sensory Level:  T8    Additional Comments

## 2022-05-27 NOTE — L&D DELIVERY NOTE
[facility] HOSP - [facility]    Vaginal Delivery Note    [address] [facility] Patient Status:  Inpatient    1990 MRN [id number]   Location [address] Attending Zarina Velazquez MD   Hosp Day # 1 PCP None Pcp     Delivery     Infant Info:  Date of Delivery: 2022, Time of Delivery: 12:36 PM, Delivery Type: Normal spontaneous vaginal delivery    Information for the patient's : Meryle Acton, Boy [[id number]]   5 lb 15.2 oz (2.7 kg)    Apgars:    1 minute: 9               5 minutes: 9                      10 minutes:       Delivery Narrative: Patient pushed for 1  hours prior to delivering a live male infant over intact perineum in MEHRDAD position. No nuchal cord noted. Infant was bulb suctioned prior to delivering in toto. Cord doubly clamped & cut after 30 seconds. Infant handed to awaiting neonatalogist. Second degree midline perineal laceration repaired with 2-0 Vicryl in normal usual fashion. No sulcus, periuretheral, nor cervical lacerations noted. Placenta delivered spontaneously, intact and normal in appearance with 2 vessel cord. Mother in stable condition.     Maternal Anesthesia: epidural     Delivery Complications:  none    Neonatologist Present: yes    Placenta info:  Date/Time of Delivery: 2022 12:43 PM   Delivery: spontaneous  Placenta to Pathology: yes    Cord info:  Cord Gases Submitted: no  Cord Blood Collection: no  Cord Tissue Collection: no  Cord Complications: none    Sponge and Needle Counts:  Verified    Quantitative Blood Loss (mL) 90      Cayden You MD   2022  2:01 PM

## 2022-05-27 NOTE — PROGRESS NOTES
Pt seen and examined    Changed to 7 cm to  9cm. FHT reassuring. Pt feeling intense pelvic pressure and left sided pain. Dr Kulkarni Bachelor redoses with some relief. Cx still 9cm/1+ station. Given no progress from prevous exam, start pitocin augmentation to see if remaining cervix will pass    Of note, pt in sigificant discomfort from pelvic pressure. Had 3 severe range BPs. Denies ha, blurry vision, cp, sob, ruq pain  IV labetalol 20mg x 1 given with good response. CBC, CMP urine PC ratio sent. Based on results will determine if mag needed, however at this time, suspect pain related.      fht cat 1-2 with occasional variables

## 2022-05-28 NOTE — LACTATION NOTE
This note was copied from a baby's chart. LACTATION NOTE - INFANT    Evaluation Type  Evaluation Type: Inpatient    Problems & Assessment  Problems Diagnosed or Identified: CONSTANTINOXYW;78-32 weeks gestation  Problems: comment/detail: infant with audible nasal congestion  Infant Assessment: Anterior fontanel soft and flat;Skin color: pink or appropriate for ethnicity; Minimal hunger cues present  Muscle tone: Appropriate for GA    Feeding Assessment  Summary Current Feeding: Infant not latching to breast  Breastfeeding Assessment: Assisted with breastfeeding w/mother's permission; No sustained latch to breast  Breastfeeding Positions: laid back;left breast  Latch: Too sleepy or reluctant, no latch achieved  Audible Sucks/Swallows: None  Type of Nipple: Everted (after stimulation)  Comfort (Breast/Nipple): Soft/non-tender  Hold (Positioning): Full assist, teach one side, mother does other, staff holds  [facility] Score: 5  Other (comment): Called to L/D to assist with breastfeeding. Infant sleepy, not exhibiting feeding cues, and with audible nasal congestion. Mom unsuccessfully tried expressing colostrum using a manual pump and hand expression. Infant fed 5 ml formula via syringe without incident.          Pre/Post Weights  Supplement Type: Formula  Supplement total, ml: 5    Equipment used  Equipment used: Syringe

## 2022-05-28 NOTE — PROGRESS NOTES
Patient up to bathroom with assist. Patient transferred to mother/baby room 366 per wheelchair in stable condition with personal belongings. Accompanied by significant other and staff. Report given to mother/baby RN.

## 2022-05-28 NOTE — LACTATION NOTE
LACTATION NOTE - MOTHER      Evaluation Type: Inpatient    Problems identified  Problems identified: Knowledge deficit; Unable to acheive sustained latch    Maternal history  Maternal history: Induction of labor; Anxiety; Anemia  Other/comment: preeclampsia-magnesium drip    Breastfeeding goal  Breastfeeding goal: To maintain breast milk feeding per patient goal    Maternal Assessment  Bilateral Breasts: Dense;Symmetrical  Bilateral Nipples: Colostrum difficult to express;Slightly everted/short  Prior breastfeeding experience (comment below): Primip  Breastfeeding Assistance: Breastfeeding assistance provided with permission         Guidelines for use of:  Breast pump type: Hand Pump  Suggested use of pump: Pump each time a supplement is offered;Pump if infant is not latching to breast  Other (comment): Called to L/D to assist with breastfeeding. Infant sleepy, not exhibiting feeding cues, and with audible nasal congestion. Mom unsuccessfully tried expressing colostrum using a manual pump and hand expression. Infant fed 5 ml formula via syringe without incident.

## 2022-05-29 NOTE — DISCHARGE PLANNING
Patient and family ready for discharge per MD orders. D/c instructions reviewed with family, verbalize understanding. All questions answered. Encouraged to call MD with any questions or concerns. Aware of need to set follow up appt and verbalize understanding of all medications. Patient and family left at this time with all belongings and moved to [facility] to be with baby.

## 2022-05-29 NOTE — CM/SW NOTE
CM was contacted by RN stating EPDS score is 9, baby is in SCN. CM attempted to call pt to schedule visit and provide resources,no answer. CM notified RN of above. RN sts pt is visiting baby. CM requested that RN call CM when pt returns to room and is available to meet. 1100  CM met with pt and spouse at bedside. EPDS reviewed. Questions answered and concerns addressed. Pt and spouse both verbalized they feel their anxiety is less every day as they get more info and support from Count includes the [facility] staff re status of baby. CM provided PMAD and support group resources. CM notified RN that pt is cleared for dc from this point. / to remain available for support and/or discharge planning.      Sandip Chadwick, RN    Ext [phone]

## 2022-05-29 NOTE — LACTATION NOTE
05/29/22 1045   Evaluation Type   Evaluation Type Inpatient   Problems identified   Problems identified Knowledge deficit; Nipple pain;Milk supply not WNL; Nipple trauma   Milk supply not WNL Reduced (potential)   Problems Identified Other infant separation   Maternal history   Maternal history Anemia; Anxiety; Induction of labor   Other/comment preeclampsia-magnesium drip   Breastfeeding goal   Breastfeeding goal To maintain breast milk feeding per patient goal   Maternal Assessment   Bilateral Nipples Sore   Prior breastfeeding experience (comment below) Primip   Pain assessment   Pain, additional Pain location;Pain w/pumping   Pain Location Nipples   Guidelines for use of:   Breast pump type Ameda Platinum   Current use of pump: pumping for infant in SCN   Suggested use of pump Pump 8-12X/24hr   Other (comment) Mom c/o pain and nipple trauma with pumping. Reviewed appropriate suction levels and plan made to do a flange assessment with her next pumping. Reassured that it can be normal to pump only drops in the first few days postpartum. Encouraged to have as much skin to skin contact with her infant as possible and to consider renting a hospital grade pump to help her establish her milk supply.

## 2022-06-02 NOTE — TELEPHONE ENCOUNTER
Patient would like to reschedule her BP check appointment from today at 3:30 to tomorrow morning. Please advise. Sent as high priority due to timing.

## 2022-06-02 NOTE — TELEPHONE ENCOUNTER
Pt states she received a breast pump 5/2022 that she is returning d/t she can get one thru [employer] more reasonable in cost.    Form filled out for [employer] for breast pump and faxed to [employer]. Copy of order sent to scanning.

## 2022-06-02 NOTE — PROGRESS NOTES
1st attempt OBGYN Post Partum apt request  (delivered 05/27)    Dr Anne-Marie Corrigan  [address]  [phone]  Follow up 6 weeks  Apt made:   Thu 07/14 @8:10am  Confirmed w/pt   Closing encounter

## 2022-06-02 NOTE — TELEPHONE ENCOUNTER
Pt states LILLIAM was suppose to send a prescription to the pharmacy and nothing sent, also has pp visit scheduled but states LILLIAM told her to schedule something else but pt doesn't remember what else she needed to come in for.  Please advise

## 2022-06-02 NOTE — TELEPHONE ENCOUNTER
Pt calling indicating she was told by [facility] to take Potassium medication that was sent to [place] by Hospitalist Dr. King Owens. Pt states she was told by the pharmacy that no order was sent. Pt also indicates she was told to schedule a second sooner appt but pt is unsure why. Pt had Sever Pre-eclampsia during labor and was on magnesium sulfate, RN indicated pt would need to be seen for f/u bp check in office. Pt indicates that is what [facility] indicated. Pt states she was discharged on 5/29 and infant is still in SCN and can come at anytime since she is visiting in the [place] daily. Pt offered appt today for BP check in office, pt accepts 330pm. Pt indicates she was not instructed to take BP's at home nor keep a log. Pt was not discharged on BP medication, nor was she on medication during pregnancy. Pharmacy called, indicating they will fill Potassium order. Pt called and notified.

## 2022-06-11 NOTE — NST
Nonstress Test   Patient: Zachariah Orellana    5/[birth year]    Diagnosis from order: Single umbilical artery affecting management of mother in [place] pregnancy, antepartum, 30 weeks gestation of pregnancy       NST: 130/mod/R    Rhonda Trinidad DO  6/10/2022  10:04 PM

## 2022-06-17 NOTE — NST
Nonstress Test   Patient: Nghia Orellana    Gestation: 38w4d    Diagnosis from order: Single umbilical artery affecting management of mother in [place] pregnancy, antepartum, 30 weeks gestation of pregnancy       NST:         NST DOCUMENTATION 2022   Variability 6-25 BPM   Accelerations Yes   Decelerations None   Baseline 135   Uterine Irritability No   Contractions Not present   Acoustic Stimulator No   Nonstress Test Interpretation Reactive   Nonstress Test Second Interpretation Reactive   NST Completed by Loy Garcia RN   Disposition Home    Testing Plan Weekly NST   Provider Notified Ramiro Glez DO         I agree with the above evaluation. NST completed.   Idania Shea DO  2022  12:02 AM

## 2022-06-24 NOTE — TELEPHONE ENCOUNTER
Pt returning call 6/25 at 8:50am at [facility]/Pavithra works for her

## 2022-06-24 NOTE — TELEPHONE ENCOUNTER
Luis and michael sent. (Spoke with CAP in clinic regarding pt's message and v/o received okay to add pt to CAP schedule on 6/25 at 8:50 am).

## 2022-07-18 NOTE — TELEPHONE ENCOUNTER
Pt has IUD insertion appt tomorrow and asking if she can do the lab at [facility]? Informed pt she can and provided lab hours for today. Pt agrees.

## 2022-07-19 NOTE — PROCEDURES
With  consent, I injected the patient's left dequervain's  wrist with 0.5ml lidocaine  1% and 0.5ml kenalog 40. It was under sterile technique using iodine and alcohol swabs and ethyl chloride was used as an anaesthetic spray. Pt.  tolerated it well.

## 2022-07-19 NOTE — TELEPHONE ENCOUNTER
Per [facility] pt to take Cytotec now and move appt to 3pm. Pt called and informed that she needs to take Cytotec ASAP and we will move her appt to 3 pm. Pt states understanding and accepts appt change.

## 2022-07-19 NOTE — TELEPHONE ENCOUNTER
Patient forgot to  required Cytotec to take last night. She needs some guidance regarding her 1:00 IUD insert appointment for today. Please call. Sent as high priority due to timing.

## 2022-07-22 NOTE — TELEPHONE ENCOUNTER
From: [facility]  To: Xochitl Aguilera MD  Sent: 7/21/2022 11:04 PM CDT  Subject: IUD Insertion    Good evening,    I got an IUD insertion on Tuesday 7/19 by Dr. Winter Norman and she said to check the strings every month but I was taking a shower tonight and I felt them almost falling out of my vagina so I would like to have it checked as soon as possible.      Thank you    - Kristy Cordova

## 2022-07-22 NOTE — TELEPHONE ENCOUNTER
Offered an appt with LILLIAM today at 3pm. Pt accepted the appt. Informed  to open an appt and schedule pt for the appt. Pt aware of the date, time and location for the appt.

## 2022-07-22 NOTE — TELEPHONE ENCOUNTER
Pt had her IUD, Mirena, inserted on 7/19 by [employer]. Pt states she was taking a shower yesterday evening and she could feel the strings, without inserting her fingers. Pt states they are to low. Denies pain and fever. Pt has cramping that is intermittent, 6/10. She states when she urinates and wipes she sees a grey color. She noticed this on Tues and Wednesday. Period ended Wednesday, 7/20. Denies vag odor and vag itching. Denies urgency, frequency, painful urination and burning with urination. Pt is bring her son to peds today for an appt at 1130am.      Sent to [facility] for recs.

## 2022-07-23 NOTE — PROCEDURES
IUD Insertion     Birth control method(s) used:    LMP: 7/15/22    Consent signed. Procedure discussed with the patient in detail including indication, risks, benefits, alternatives and complications. Pelvic Exam Findings:  Uterus: normal    Procedure:  Speculum placed in the vagina. Betadine wash of vagina and cervix. Single tooth tenaculum was placed at the 12 o'clock position. Uterus sounded to 7 cm. Mirena IUD was placed without difficulty. Strings cut at 3 cm. Single tooth tenaculum removed. Silver nitrate used to achieve hemostasis. Good hemostasis noted. Patient tolerated procedure well. Visit Plan:  IUD surveillance was discussed with the patient.

## 2022-08-30 NOTE — TELEPHONE ENCOUNTER
Pt has IUD for a month ,  Pt got it in on last day of menses ,  Pt seems to be bleeding more than  She use too , asking to speak to nurse ,

## 2022-08-30 NOTE — TELEPHONE ENCOUNTER
Patient calling to report irregular bleeding with Mirena. Mirena inserted on 7/19/22.  8/22/22 has 3-4 days of regular period flow, then decreased to spotting and resolved. On 8/28 flow returned, and is now alternating between period flow and spotting. She denies soaking through a pad. Switches from pads to liners as needed. She denies cramping or pain. Has noticed a new pain under left rib cage. Denies CP/SOB, pain that radiates to neck, jaw, extremities, dizziness. Advised normal to have irregular cycles with new IUD, can last 3-6 months. She should notice that bleeding is lighter as time goes by. Bleeding and pain precautions reviewed. Notified pt to reach out to primary regarding LUQ pain if it doesn't resolve.

## 2022-11-23 NOTE — ANESTHESIA POSTPROCEDURE EVALUATION
Patient: Jeremy Orellana    Procedure Summary     Date: 05/27/22 Room / Location:     Anesthesia Start: 0606 Anesthesia Stop: 2531    Procedure: LABOR ANALGESIA Diagnosis:     Scheduled Providers:  Anesthesiologist: Tom Gale MD    Anesthesia Type: epidural ASA Status: 2 - Emergent          Anesthesia Type: epidural    Vitals Value Taken Time   /45 05/27/22 1301   Temp 98 05/27/22 1311   Pulse 100 05/27/22 1310   Resp 12 05/27/22 1311   SpO2 99 % 05/27/22 1310   Vitals shown include unvalidated device data.     [address] [facility] AN Post Evaluation:   Patient Evaluated in PACU  Patient Participation: complete - patient participated  Level of Consciousness: awake  Pain Score: 2  Pain Management: adequate  Airway Patency:patent  Dental exam unchanged from preop  Yes    Cardiovascular Status: acceptable  Respiratory Status: acceptable  Postoperative Hydration acceptable      Steve Barba MD  5/27/2022 1:11 PM Nursing/Physical therapy

## 2022-12-13 NOTE — TELEPHONE ENCOUNTER
----- Message from Junior Harris MD sent at 12/11/2022 12:49 PM CST -----  Please call pt and inform her of results attached -- needs colpo appt & then separate embx appt. Has Mirena IUD.  On call 11:30-noon or 1-3 pm. Premed for embx w/ 2 alevel one hour before

## 2022-12-17 NOTE — TELEPHONE ENCOUNTER
----- Message from Bernadette Ridley MD sent at 12/11/2022 12:49 PM CST -----  Please call pt and inform her of results attached -- needs colpo appt & then separate embx appt. Has Mirena IUD.  On call 11:30-noon or 1-3 pm. Premed for embx w/ 2 alevel one hour before

## 2022-12-17 NOTE — TELEPHONE ENCOUNTER
Pt informed of results and need for colpo and embx. Colpo booked on 1/18 and embx booked on 1/30. Pt advised to take 2 aleve 1 hour before each appt. Pt states she started spotting yesterday. Pt has a mirena but states she has had one period since getting the IUD placed. Pt also states she is under more stress lately. Pt informed spotting is nor uncommon with an IUD and that stress can affect hormones as well. Pt instructed to monitor and call if bleeding becomes heavy. Pt states she's been having bad headaches all last week. She states she is taking caffeine pills that were left over from her pregnancy when she previous had bad headaches. Pt states last night she also vomited and was alternating sweating and having chills. Pt has not taken her temp. Pt advised to call pcp regarding these symptoms. Message to [address] to verify if colpo done on 1/18, embx can be done on 1/30.   Or will pt need more time to heal?

## 2022-12-19 NOTE — ED INITIAL ASSESSMENT (HPI)
Pt presents to ED for frequent headaches x 1 week but worse today. Pt states she has taken Advil and a caffeine pill with no relief.

## 2023-01-18 NOTE — PROCEDURES
Colpo w/Cx Biopsy and ECC      Birth control method(s) used: Mirena IUD    Consent signed. Procedure discussed with patient in detail including indication, risk, benefits, alternatives and complications. Indication: ASCUS (+) HPV & JOSUE    Procedure:  Under satisfactory analgesia, the patient was placed in the dorsal lithotomy position. [place] speculum was placed in the vagina. Cervix prepped with: Acetic acid  Under colposcopic examination the transition zone was seen in entirety without need for endocervical speculum. Cervical biopsy performed with cervical biopsy punch at 6, 10 o'clock. Endocervix was curetted using a Kervorkian curette. Monsel's solution was applied. Specimen sent to pathology. Patient tolerated procedure well. Discharge instructions of pelvic rest & no swimming x one week.       Findings:  White epithelium    Impression:  await results -- has embx appt in 2 wks

## 2023-01-28 NOTE — TELEPHONE ENCOUNTER
Pt informed of results and need for LEEP. Appt scheduled on 2/28. Pt advised to take 2 Aleve 1 hour before appt. Pt advised to do a covid test 3 days before procedure. Order placed. Pt asked if she still need to keep embx appt on 1/30. Pt informed she should keep that appt.

## 2023-01-28 NOTE — TELEPHONE ENCOUNTER
----- Message from Tiana Rivera MD sent at 1/27/2023  8:13 PM CST -----  [place] dx of ANASTACIO 2-3 foci -- needs LEEP in 4 weeks.  Has appt for embx in couple days

## 2023-01-30 NOTE — PROCEDURES
Endometrial Biopsy     Pre-Procedure Care:   Consent was obtained. Procedure/risks were explained. Questions were answered. Correct patient was identified. Correct side and site were confirmed. Birth control method(s) used:  Mirena IUD    Indication: JOSUE    Pre-Medications: The patient was premedicated with Naproxen Sodium. Description of Procedure:   A bivalve speculum was placed in the vagina and the cervix was prepped with betadine solution. Single tooth tenaculum placed at the 12 o'clock position. The uterine cavity was sounded at 8 cm. The endometrial cavity was curetted for pipelle tissue sampling with 2 passes. Specimen was sent to pathology. The single tooth tenaculum was removed. Silver nitrate was applied at the site of tenaculum application   Good hemostasis was noted. There were no complications. There was no blood loss. Discharge instructions were provided to the patient.     Visit Plan:  Has LEEP appt in one month

## 2023-02-28 NOTE — PROCEDURES
Colpo with Leep Cone Biopsy      Birth control method(s) used: IUD - Mirena    Consent signed. Procedure discussed with patient in detail including indication, risk, benefits, alternatives and complications. Indication: ANASTACIO 3    Pre-Procedure:  Pre-Meds: Ibuprofen  Cervix prepped with: Lugol  Wattage: 46 watt blend current and 50 watt pure cautery current    Procedure:  Coated bivalve speculum was placed in the vagina. Coated lateral wall retractors placed. Under colposcopic examination the transition zone was seen in entirety. Lugol staining done of entire ectocervix. Paracervical block was performed using 3.4 cc of xylocaine with EPI. Superficial leep performed using wide loop wires. Top Hat done using narrow loop wire. Endocervix was curetted using a Kervorkian curette post leep. Ablation of base done with ball cautery. Specimen sent to pathology with stitch placed at 6 o'clock  position. Top Hat stitch at 12 o'clock  Patient tolerated procedure well. Findings:   No lugol uptake    Impression:  High-grade dysplasia    Followup:  4 weeks    Instructions:  No swimming, soaking baths, sex, tampons, douching for 4 weeks. Flagyl 500 mg po bid x 7d sent to pharm.

## 2023-02-28 NOTE — TELEPHONE ENCOUNTER
Pt called and informed that [facility] did send over flagyl 500 mg BID x7 days. Pharmacy confirmed to be correct, informed pt they indicate receipt at 428 pm today. Pt states she will call pharmacy to check status.

## 2023-02-28 NOTE — TELEPHONE ENCOUNTER
Patient was seen today for a Leep procedure and was told an antibiotic would be prescribed for her. Her pharmacy has yet to receive that. Please advise.

## 2023-05-01 NOTE — TELEPHONE ENCOUNTER
See mychart message below. Called pt; pt reports pain of skin surrounding perineal area that is not associated with urination. Having white vaginal discharge with odor. Pt also reports pain with intercourse recently post LEEP procedure on 4/3. Advised pt she needs to be seen but [place] does not have available slots. Pt prefers appt between 8-11am due to work. To [facility] to please advise.

## 2023-10-14 NOTE — TELEPHONE ENCOUNTER
Please review refill protocol failed/ no protocol  Requested Prescriptions   Pending Prescriptions Disp Refills    PROPRANOLOL 20 MG Oral Tab [Pharmacy Med Name: PROPRANOLOL 20MG TABLETS] 180 tablet 0     Sig: TAKE 1 TABLET(20 MG) BY MOUTH TWICE DAILY       Hypertensive Medications Protocol Failed - 10/12/2023  5:31 PM        Failed - CMP or BMP in past 6 months     No results found for this or any previous visit (from the past 4392 hour(s)).             Passed - In person appointment in the past 12 or next 3 months     Recent Outpatient Visits              2 months ago Chronic migraine without aura without status migrainosus, not intractable    [facility], [address], [place] APRN    Office Visit    5 months ago Dyspareunia, female    [facility] Cavanaugh, [address] [facility],[address], [address], Chon Mendez MD    Office Visit    6 months ago ANASTACIO III (cervical intraepithelial neoplasia III)    Pati Cavanaugh, [address] [facility],[address], [address] [facility] Erwin Colin MD    Office Visit    6 months ago Acute non-recurrent maxillary sinusitis    [facility], [address], [facility], APRN    Office Visit    7 months ago Severe dysplasia of cervix (ANASTACIO III)    Pati Cavanaugh, [address] [facility],[address], [address], Chon Mendez MD    Office Visit          Future Appointments         Provider Department Appt Notes    In 4 months MD Pati Stinson, [address] [facility],[address], [address] - Last BP reading less than 140/90     BP Readings from Last 1 Encounters:  07/17/23 : 117/75              Passed - In person appointment or virtual visit in the past 6 months     Recent Outpatient Visits              2 months ago Chronic migraine without aura without status migrainosus, not intractable    [facility], [address], [place]., APRN    Office Visit    5 months ago Dyspareunia, female    [address], [address], [address] [facility] Marcell Evans MD    Office Visit    6 months ago ANASTACIO III (cervical intraepithelial neoplasia III)    [address], [address], [address] [facility] [address] Marcell Evans MD    Office Visit    6 months ago Acute non-recurrent maxillary sinusitis    [facility], [address], [address], [facility], APRN    Office Visit    7 months ago Severe dysplasia of cervix (ANASTACIO III)    [address], [address], Anjali Stubbs MD    Office Visit          Future Appointments         Provider Department Appt Notes    In 4 months Marcell Evans MD [address], [address], [address] [facility] or [facility] > 50     GFR Evaluation  EGFRCR: 106 , resulted on 12/18/2022

## 2024-02-12 NOTE — PROGRESS NOTES
Rhina Orellana is a 33 year old female  No LMP recorded (lmp unknown). (Menstrual status: IUD - Intrauterine Device).   Chief Complaint   Patient presents with    Gyn Exam     ANNUAL EXAM    Std Screen     Office cx only    Follow Up Pap     Hx  LEEP ANASTACIO 2-3   .    OBSTETRICS HISTORY:     OB History    Para Term  AB Living   1 1 1     1   SAB IAB Ectopic Multiple Live Births         0 1      # Outcome Date GA Lbr Armani/2nd Weight Sex Delivery Anes PTL Lv   1 Term 22 38w4d 09:41 / 01:10 5 lb 15.2 oz (2.7 kg) M NORMAL SPONT EPI N DESIREE      Complications: Late decelerations, Variable decelerations, Preeclampsia       GYNE HISTORY:     Periods absent      Mirena IUD (22)    History   Sexual Activity    Sexual activity: Not Currently        Hx Prior Abnormal Pap: No  Pap Date: 22  Pap Result Notes: ASCUS/HPV+ // Colpo 23 // Leep ANASTACIO 3 23  Follow Up Recommendation: 22 NJG          Latest Ref Rng & Units 2022     1:49 PM 2021     7:30 PM   RECENT PAP RESULTS   Thinprep Pap Negative for intraepithelial lesion or malignancy Atypical glandular cells  Atypical squamous cells of undetermined significance (ASC-US)  Negative for intraepithelial lesion or malignancy    HPV Negative Positive           MEDICAL HISTORY:     Past Medical History:   Diagnosis Date    Mental disorder     anxiety    Patient denies medical problems      Past Surgical History:   Procedure Laterality Date    PATIENT DENIES ANY SURGICAL HISTORY       OB History    Para Term  AB Living   1 1 1 0 0 1   SAB IAB Ectopic Multiple Live Births   0 0 0 0 1        SOCIAL HISTORY:     Tobacco Use: Medium Risk (2024)    Patient History     Smoking Tobacco Use: Former     Smokeless Tobacco Use: Never     Passive Exposure: Not on file       FAMILY HISTORY:     No family history on file.      MEDICATIONS:       Current Outpatient Medications:     propranolol 20 MG Oral Tab, Take  1 tablet (20 mg total) by mouth 2 (two) times daily., Disp: 180 tablet, Rfl: 3    Prenatal Vit-Fe Sulfate-FA (PRENATAL VITAMIN OR), Take by mouth., Disp: , Rfl:     ALLERGIES:     No Known Allergies      REVIEW OF SYSTEMS:     Constitutional:    denies fever / chills  Eyes:     denies blurred or double vision  Cardiovascular:  denies chest pain or palpitations  Respiratory:    denies shortness of breath  Gastrointestinal:  denies severe abdominal pain, frequent diarrhea or constipation, nausea / vomiting  Genitourinary:    denies dysuria, bothersome incontinence  Skin/Breast:   denies any breast pain, lumps, or discharge  Neurological:    denies frequent severe headaches  Psychiatric:   denies depression or anxiety, thoughts of harming self or others  Heme/Lymph:    denies easy bruising or bleeding      PHYSICAL EXAM:   Blood pressure 109/72, pulse 66, height 5' 1.7\" (1.567 m), weight 157 lb (71.2 kg), not currently breastfeeding.  Constitutional:  well developed, well nourished  Head/Face:  normocephalic  Neck/Thyroid: thyroid symmetric, no thyromegaly, no nodules, no adenopathy  Lymphatic: no abnormal supraclavicular or axillary adenopathy is noted  Breast:   normal without palpable masses, tenderness, asymmetry, nipple discharge, nipple retraction or skin changes  Abdomen:   soft, nontender, nondistended, no masses  Skin/Hair:  no unusual rashes or bruises  Extremities:  no edema, no cyanosis  Psychiatric:   oriented to time, place, person and situation. Appropriate mood and affect    Pelvic Exam:  External Genitalia:  normal appearance, hair distribution, and no lesions  Urethral Meatus:   normal in size, location, without lesions and prolapse  Bladder:    no fullness, masses or tenderness  Vagina:    normal appearance without lesions, no abnormal discharge  Cervix:    normal without tenderness on motion NO IUD strings  Uterus:    normal in size, contour, position, mobility, without tenderness  Adnexa:   normal  without masses or tenderness  Perineum:   normal  Anus: no hemorroids         ASSESSMENT & PLAN:     Rhina Tijerina was seen today for gyn exam, std screen and follow up pap.    Diagnoses and all orders for this visit:    Encounter for gynecological examination without abnormal finding    ANASTACIO III (cervical intraepithelial neoplasia III)    History of loop electrical excision procedure (LEEP)    Intrauterine contraceptive device threads lost, subsequent encounter        SUMMARY:  Pap: Next cotest today per ASCCP guidelines. Reviewed plan given hx LEEP for ANASTACIO 2-3  BCM:  Mirena IUD (7/19/22)  STD screening: GC/Chl/Trich only, declines blood STD screen, condoms encouraged  Mammogram: n/a -- once 42 yrs old  HM updated  Depression screen:   Depression Screening (PHQ-2/PHQ-9): Over the LAST 2 WEEKS   Little interest or pleasure in doing things: Several days    Feeling down, depressed, or hopeless: Several days    PHQ-2 SCORE: 2   1. Little interest or pleasure in doing things: Several days  2. Feeling down, depressed, or hopeless: Several days  3.    4.    5.    6.    7.    8.    9.                FOLLOW-UP     Return in about 1 year (around 2/12/2025) for annual gyne exam, IUD check, cotest.    Note to patient and family:  The 21st Century Cures Act makes medical notes available to patients in the interest of transparency.  However, please be advised that this is a medical document.  It is intended as a peer to peer communication.  It is written in medical language and may contain abbreviations or verbiage that are technical and unfamiliar.  It may appear blunt or direct.  Medical documents are intended to carry relevant information, facts as evident, and the clinical opinion of the practitioner.

## 2024-07-29 NOTE — LETTER
"Request for medication refill:  levothyroxine (SYNTHROID/LEVOTHROID) 137 MCG tablet     Providers if patient needs an appointment and you are willing to give a one month supply please refill for one month and  send a letter/MyChart using \".SMILLIMITEDREFILL\" .smillimited and route chart to \"P SMI \" (Giving one month refill in non controlled medications is strongly recommended before denial)    If refill has been denied, meaning absolutely no refills without visit, please complete the smart phrase \".smirxrefuse\" and route it to the \"P SMI MED REFILLS\"  pool to inform the patient and the pharmacy.    Glenda Nazario, CMA      " Date: 3/21/2023    Patient Name: [facility] - KARLEE Orellana          To Whom it may concern: This letter has been written at the patient's request. The above patient was seen at the [facility] for treatment of a medical condition. This patient should be excused from attending work/school from 3/21/23 through 3/22/23. The patient may return to work/school with the following limitations none. Sincerely,      K. Hilma Lesches, FNP-C  [facility]  03/21/23  1:12 PM

## 2024-08-27 ENCOUNTER — APPOINTMENT (OUTPATIENT)
Dept: URGENT CARE | Age: 34
End: 2024-08-27

## (undated) DIAGNOSIS — O99.810 ABNORMAL MATERNAL GLUCOSE TOLERANCE, ANTEPARTUM: Primary | ICD-10-CM

## (undated) DIAGNOSIS — O43.129 VELAMENTOUS INSERTION OF UMBILICAL CORD, ANTEPARTUM: Primary | ICD-10-CM

## (undated) DIAGNOSIS — Q27.0 SINGLE UMBILICAL ARTERY: ICD-10-CM

## (undated) NOTE — LETTER
Patient Name: Alexandro Crain  : 1990  MRN: [id number]  Patient Address: [address] [place] [address]      [facility] is committed to the safety and well-being of our patients, members, emp therapy. These treatments, when available and appropriate, should be given as soon as possible after diagnosis.       Seek Further Care      If you are awaiting test results or are confirmed positive for COVID-19, and your symptoms worsen at home with sympt doorknobs. Use household cleaning sprays or wipes according to the label instructions. Post-Discharge Follow-up  Please call your primary care provider within two days of your discharge to arrange for a telehealth follow-up.  CDC does not recommend Control & Prevention (CDC)  10 things you can do to manage your health at home, [email or web address] pdf  [email or web address]

## (undated) NOTE — LETTER
[facility]  Administration of Anesthesia  1. I, [employer], or _________________________________ acting on her behalf, (Patient) (Dependent/Representative) request to receive anesthesia for my pending procedure/operation/treatment. A physician (anesthesiologist) alone or an anesthesiologist working with a nurse anesthetist may administer my anesthesia. 2. I understand that my anesthesiologist is not an employee or agent of the hospital, but is an independent medical practitioner who has been permitted to use its facilities for the care and treatment of his/her patients. 3. I acknowledge that a physician from [facility] or their designate(s), recommended anesthesia for me using her/his medical judgment. The type(s) of anesthesia I may receive include:                a) General Anesthesia, b) Spinal/Epidural Anesthesia, c) Regional Anesthesia or d) Monitored Anesthesia Care. 4. If my spinal, regional or monitored anesthesia care (local) is not satisfactory for my comfort, or if my medical condition requires, I consent to the administration of general anesthesia. 5. I am aware that the practice of anesthesiology is not an exact science and that some foreseeable risks or consequences may occur. Some common risks/consequences include sore throat and hoarseness, nausea and vomiting, muscle soreness, backache, damage to the mouth/teeth/vocal cords and eye injury. I understand that more rare but serious potential risks of anesthesia include blood pressure changes, drug reactions, cardiac arrest, brain damage, paralysis or death. These risks apply to whether I have general, spinal/epidural, regional or monitored anesthesia care. 6. OBSTETRIC PATIENTS: Specific risks/consequences of spinal/epidural anesthesia may include itching, low blood pressure, difficulty urinating, slowing of the baby's heart rate and headache.  Rare risks include infections, high spinal block, spinal bleeding, seizure, cardiac arrest and death. 7. AWARENESS: I understand that it is possible (but unlikely) to have explicit memory of events from the operating room while under general anesthesia. 8. ELECTROCONVULSIVE THERAPY PATIENTS: This consent serves for all treatments in a single course of therapy. 9. I understand that I must inform my anesthesiologist when I last ate and/or drank to minimize the risk of anesthesia. 10. If I am pregnant, or may pregnant, I understand that elective surgery should be postponed until after the baby is born. Anesthetics cross the placenta and may temporarily anesthetize the baby. Although fetal complications of anesthesia during pregnancy are rare, they may include birth defects, premature labor, brain damage and death. 11. I certify that I informed the anesthesiologist, to the best of my ability, about medication I take including blood thinners, anticoagulants, herbal remedies, narcotics and recreational drugs (e.g. cocaine, marijuana, PCP). Failure to inform my anesthesiologist about these medicines may increase my risk of anesthetic complications. The nature and purpose of my anesthetic management was explained to me. I had the opportunity to ask questions and the answers and information provided meet my satisfaction.   I retain the right to withdraw this consent at any time prior to the administration of said anesthetic.    ___________________________________________________           _____________________________________________________  Patient Signature                                                                                      Witness Signature                ___________________________________________________           _____________________________________________________  Date/Time                                                                                               Responsible person in case of minor/ unconscious pt /Relationship    My signature below affirms that prior to the time of the procedure, I have explained to the patient and/or his/her guardian, the risks and benefits of undergoing anesthesia, as well as any reasonable alternatives.     ___________________________________________________            _____________________________________________________  Physician Signature                            Date/Time  Patient Name: Jeremy Orellana     : 1990     Printed: 2022      Medical Record #: [id number]                              Page 1 of 1    ----------ANESTHESIA CONSENT----------

## (undated) NOTE — LETTER
AUTHORIZATION FOR SURGICAL OPERATION OR OTHER PROCEDURE    1. I hereby authorize Dr. Karen Watkins and [place] [employer], [facility] staff assigned to my case to perform the following operation and/or procedure at the [address]      _______________________________________________________________________________________________    2. My physician has explained the nature and purpose of the operation or other procedure, possible alternative methods of treatment, the risks involved, and the possibility of complication to me. I acknowledge that no guarantee has been made as to the result that may be obtained. 3.  I recognize that, during the course of this operation, or other procedure, unforseen conditions may necessitate additional or different procedure than those listed above. I, therefore, further authorize and request that the above named physician, his/her physician assistants or designees perform such procedures as are, in his/her professional opinion, necessary and desirable. 4.  Any tissue or organs removed in the operation or other procedure may be disposed of by and at the discretion of the [facility] and [facility]. 5.  I understand that in the event of a medical emergency, I will be transported by local paramedics to [facility] or other hospital emergency department. 6.  I certify that I have read and fully understand the above consent to operation and/or other procedure. 7.  I acknowledge that my physician has explained sedation/analgesia administration to me including the risks and benefits. I consent to the administration of sedation/analgesia as may be necessary or desirable in the judgement of my physician. Witness signature: ___________________________________________________ Date:  ______/______/_____                    Time:  ________ A. M.  P.M.        Patient Name:  ______________________________________________________  (please print)      Patient signature:  ___________________________________________________             Relationship to Patient:           []  Parent    Responsible person                          []  Spouse  In case of minor or                    [] Other  _____________   Incompetent name:  __________________________________________________                               (please print)      _____________      Responsible person  In case of minor or  Incompetent signature:  _______________________________________________    Statement of Physician  My signature below affirms that prior to the time of the procedure, I have explained to the patient and/or his/her guardian, the risks and benefits involved in the proposed treatment and any reasonable alternative to the proposed treatment. I have also explained the risks and benefits involved in the refusal of the proposed treatment and have answered the patient's questions.                         Date:  ______/______/_______  Provider                      Signature:  __________________________________________________________       Time:  ___________ A.M    P.M.

## (undated) NOTE — LETTER
AUTHORIZATION FOR SURGICAL OPERATION OR OTHER PROCEDURE    1. I hereby authorize Dr. Jaclny Sheffield, and [place] [employer] [facility] staff assigned to my case to perform the following operation and/or procedure at the [facility]:    _______________________________________________________________________________________________    COLPOSCOPY  _______________________________________________________________________________________________    2. My physician has explained the nature and purpose of the operation or other procedure, possible alternative methods of treatment, the risks involved, and the possibility of complication to me. I acknowledge that no guarantee has been made as to the result that may be obtained. 3.  I recognize that, during the course of this operation, or other procedure, unforseen conditions may necessitate additional or different procedure than those listed above. I, therefore, further authorize and request that the above named physician, his/her physician assistants or designees perform such procedures as are, in his/her professional opinion, necessary and desirable. 4.  Any tissue or organs removed in the operation or other procedure may be disposed of by and at the discretion of the [facility] and [facility]. 5.  I understand that in the event of a medical emergency, I will be transported by local paramedics to [facility] or other [facility] emergency department. 6.  I certify that I have read and fully understand the above consent to operation and/or other procedure. 7.  I acknowledge that my physician has explained sedation/analgesia administration to me including the risks and benefits. I consent to the administration of sedation/analgesia as may be necessary or desirable in the judgement of my physician. Witness signature: ___________________________________________________ Date:  ______/______/_____                    Time:  ________ A. M.  P.M. Patient Name:  ______________________________________________________  (please print)      Patient signature:  ___________________________________________________             Relationship to Patient:           []  Parent    Responsible person                          []  Spouse  In case of minor or                    [] Other  _____________   Incompetent name:  __________________________________________________                               (please print)      _____________      Responsible person  In case of minor or  Incompetent signature:  _______________________________________________    Statement of Physician  My signature below affirms that prior to the time of the procedure, I have explained to the patient and/or his/her guardian, the risks and benefits involved in the proposed treatment and any reasonable alternative to the proposed treatment. I have also explained the risks and benefits involved in the refusal of the proposed treatment and have answered the patient's questions.                         Date:  ______/______/_______  Provider                      Signature:  __________________________________________________________       Time:  ___________ A.M    P.M.

## (undated) NOTE — LETTER
AUTHORIZATION FOR SURGICAL OPERATION OR OTHER PROCEDURE    1. I hereby authorize Dr. Mervin Eisenmenger, and [facility] staff assigned to my case to perform the following operation and/or procedure at the [facility]:    Mirena insertion         2. My physician has explained the nature and purpose of the operation or other procedure, possible alternative methods of treatment, the risks involved, and the possibility of complication to me. I acknowledge that no guarantee has been made as to the result that may be obtained. 3.  I recognize that, during the course of this operation, or other procedure, unforseen conditions may necessitate additional or different procedure than those listed above. I, therefore, further authorize and request that the above named physician, his/her physician assistants or designees perform such procedures as are, in his/her professional opinion, necessary and desirable. 4.  Any tissue or organs removed in the operation or other procedure may be disposed of by and at the discretion of the [facility] and [facility]. 5.  I understand that in the event of a medical emergency, I will be transported by local paramedics to [facility] or other hospital emergency department. 6.  I certify that I have read and fully understand the above consent to operation and/or other procedure. 7.  I acknowledge that my physician has explained sedation/analgesia administration to me including the risks and benefits. I consent to the administration of sedation/analgesia as may be necessary or desirable in the judgement of my physician. Witness signature: ___________________________________________________ Date:  ______/______/_____                    Time:  ________ A. M.  P.M.        Patient Name:  Umm Orellana________________________________  (please print)      Patient signature: ___________________________________________________             Relationship to Patient:           []  Parent    Responsible person                          []  Spouse  In case of minor or                    [] Other  _____________   Incompetent name:  __________________________________________________                               (please print)      _____________      Responsible person  In case of minor or  Incompetent signature:  _______________________________________________    Statement of Physician  My signature below affirms that prior to the time of the procedure, I have explained to the patient and/or his/her guardian, the risks and benefits involved in the proposed treatment and any reasonable alternative to the proposed treatment. I have also explained the risks and benefits involved in the refusal of the proposed treatment and have answered the patient's questions.                         Date:  ______/______/_______  Provider                      Signature:  __________________________________________________________       Time:  ___________ A.M    P.M.

## (undated) NOTE — LETTER
AUTHORIZATION FOR SURGICAL OPERATION OR OTHER PROCEDURE    1. I hereby authorize Dr. Penny Pérez, and [place] [facility] staff assigned to my case to perform the following operation and/or procedure at the [place] [facility]:      [facility]      2. My physician has explained the nature and purpose of the operation or other procedure, possible alternative methods of treatment, the risks involved, and the possibility of complication to me. I acknowledge that no guarantee has been made as to the result that may be obtained. 3.  I recognize that, during the course of this operation, or other procedure, unforseen conditions may necessitate additional or different procedure than those listed above. I, therefore, further authorize and request that the above named physician, his/her physician assistants or designees perform such procedures as are, in his/her professional opinion, necessary and desirable. 4.  Any tissue or organs removed in the operation or other procedure may be disposed of by and at the discretion of the [facility] and [facility] ([facility] 5.  I understand that in the event of a medical emergency, I will be transported by local paramedics to [facility] or other hospital emergency department. 6.  I certify that I have read and fully understand the above consent to operation and/or other procedure. 7.  I acknowledge that my physician has explained sedation/analgesia administration to me including the risks and benefits. I consent to the administration of sedation/analgesia as may be necessary or desirable in the judgement of my physician. Witness signature: ___________________________________________________ Date:  ______/______/_____                    Time:  ________ A. M.  P.M.        Patient Name:  ______________________________________________________  (please print)      Patient signature:  ___________________________________________________             Relationship to Patient:           []  Parent    Responsible person                          []  Spouse  In case of minor or                    [] Other  _____________   Incompetent name:  __________________________________________________                               (please print)      _____________      Responsible person  In case of minor or  Incompetent signature:  _______________________________________________    Statement of Physician  My signature below affirms that prior to the time of the procedure, I have explained to the patient and/or his/her guardian, the risks and benefits involved in the proposed treatment and any reasonable alternative to the proposed treatment. I have also explained the risks and benefits involved in the refusal of the proposed treatment and have answered the patient's questions.                         Date:  ______/______/_______  Provider                      Signature:  __________________________________________________________       Time:  ___________ A.M    P.M.

## (undated) NOTE — LETTER
AGREEMENT FOR TREATMENT WITH Rh IMMUNE GLOBULIN      To:    Vasiliy Arshad MD (provider) and [address] [place] [address]     Date: March 14, 2022   Time: 5:24 PM  I authorize the administration of Rh Immune Globulin for    HOSP DR. TREY JORGE (myself or name of patient) as deemed advisable in the judgment of the attending physician of his associates or assistants. It is understood and agreed that the attending physician or his associates and assistants shall be responsible only for the performance of their own individual professional acts and that the blood typing and the selection of compatible Rh Immune Globulin are the responsibilities of those who actually perform the tests.    It has been fully explained that immunization with Rh Immune Globulin is not always successful in producing the desired result.           _____________________________________________  (Patient or person with authority to consent for patient)      _____________________________________________  (Relationship to patient)      _____________________________________________  (Witness)

## (undated) NOTE — LETTER
VACCINE ADMINISTRATION RECORD  PARENT / GUARDIAN APPROVAL  Date: 3/28/2022  Vaccine administered to: Jamilah Williamson     : 1990    MRN: [id number]    A copy of the appropriate Centers for Disease Control and Prevention Vaccine Information statement has been provided. I have read or have had explained the information about the diseases and the vaccines listed below. There was an opportunity to ask questions and any questions were answered satisfactorily. I believe that I understand the benefits and risks of the vaccine cited and ask that the vaccine(s) listed below be given to me or to the person named above (for whom I am authorized to make this request). VACCINES ADMINISTERED:  TDAP    I have read and hereby agree to be bound by the terms of this agreement as stated above. My signature is valid until revoked by me in writing. This document is signed by PT relationship: SELF on 3/28/2022.:                                                                                                                                         Parent / Cherene Pulse                                                Date    Colin Dorsey served as a witness to authentication that the identity of the person signing electronically is in fact the person represented as signing. This document was generated by Colin Dorsey on 3/28/2022.